# Patient Record
Sex: FEMALE | Race: WHITE | NOT HISPANIC OR LATINO | Employment: OTHER | URBAN - METROPOLITAN AREA
[De-identification: names, ages, dates, MRNs, and addresses within clinical notes are randomized per-mention and may not be internally consistent; named-entity substitution may affect disease eponyms.]

---

## 2021-04-15 DIAGNOSIS — Z23 ENCOUNTER FOR IMMUNIZATION: ICD-10-CM

## 2023-12-25 ENCOUNTER — HOSPITAL ENCOUNTER (EMERGENCY)
Facility: HOSPITAL | Age: 78
Discharge: HOME | End: 2023-12-26
Attending: EMERGENCY MEDICINE
Payer: COMMERCIAL

## 2023-12-25 DIAGNOSIS — R41.82 CHANGE IN MENTAL STATUS: ICD-10-CM

## 2023-12-25 DIAGNOSIS — R78.0 ELEVATED ETOH LEVEL: Primary | ICD-10-CM

## 2023-12-25 LAB
ALBUMIN SERPL BCP-MCNC: 4.4 G/DL (ref 3.5–5)
ALP SERPL-CCNC: 46 U/L (ref 34–104)
ALT SERPL W P-5'-P-CCNC: 16 U/L (ref 7–52)
ANION GAP SERPL CALCULATED.3IONS-SCNC: 12 MMOL/L
AST SERPL W P-5'-P-CCNC: 20 U/L (ref 13–39)
BASOPHILS # BLD AUTO: 0.03 THOUSANDS/ÂΜL (ref 0–0.1)
BASOPHILS NFR BLD AUTO: 1 % (ref 0–1)
BILIRUB SERPL-MCNC: 0.34 MG/DL (ref 0.2–1)
BUN SERPL-MCNC: 13 MG/DL (ref 5–25)
CALCIUM SERPL-MCNC: 9.2 MG/DL (ref 8.4–10.2)
CHLORIDE SERPL-SCNC: 100 MMOL/L (ref 96–108)
CO2 SERPL-SCNC: 24 MMOL/L (ref 21–32)
CREAT SERPL-MCNC: 0.83 MG/DL (ref 0.6–1.3)
EOSINOPHIL # BLD AUTO: 0.12 THOUSAND/ÂΜL (ref 0–0.61)
EOSINOPHIL NFR BLD AUTO: 3 % (ref 0–6)
ERYTHROCYTE [DISTWIDTH] IN BLOOD BY AUTOMATED COUNT: 13.3 % (ref 11.6–15.1)
ETHANOL SERPL-MCNC: 107 MG/DL
FLUAV RNA RESP QL NAA+PROBE: NEGATIVE
FLUBV RNA RESP QL NAA+PROBE: NEGATIVE
GFR SERPL CREATININE-BSD FRML MDRD: 68 ML/MIN/1.73SQ M
GLUCOSE SERPL-MCNC: 156 MG/DL (ref 65–140)
HCT VFR BLD AUTO: 40.4 % (ref 34.8–46.1)
HGB BLD-MCNC: 13.3 G/DL (ref 11.5–15.4)
IMM GRANULOCYTES # BLD AUTO: 0.02 THOUSAND/UL (ref 0–0.2)
IMM GRANULOCYTES NFR BLD AUTO: 1 % (ref 0–2)
LYMPHOCYTES # BLD AUTO: 0.76 THOUSANDS/ÂΜL (ref 0.6–4.47)
LYMPHOCYTES NFR BLD AUTO: 17 % (ref 14–44)
MCH RBC QN AUTO: 31.4 PG (ref 26.8–34.3)
MCHC RBC AUTO-ENTMCNC: 32.9 G/DL (ref 31.4–37.4)
MCV RBC AUTO: 96 FL (ref 82–98)
MONOCYTES # BLD AUTO: 0.33 THOUSAND/ÂΜL (ref 0.17–1.22)
MONOCYTES NFR BLD AUTO: 8 % (ref 4–12)
NEUTROPHILS # BLD AUTO: 3.14 THOUSANDS/ÂΜL (ref 1.85–7.62)
NEUTS SEG NFR BLD AUTO: 70 % (ref 43–75)
NRBC BLD AUTO-RTO: 0 /100 WBCS
PLATELET # BLD AUTO: 260 THOUSANDS/UL (ref 149–390)
PMV BLD AUTO: 10 FL (ref 8.9–12.7)
POTASSIUM SERPL-SCNC: 3.6 MMOL/L (ref 3.5–5.3)
PROT SERPL-MCNC: 7.1 G/DL (ref 6.4–8.4)
RBC # BLD AUTO: 4.23 MILLION/UL (ref 3.81–5.12)
RSV RNA RESP QL NAA+PROBE: NEGATIVE
SARS-COV-2 RNA RESP QL NAA+PROBE: NEGATIVE
SODIUM SERPL-SCNC: 136 MMOL/L (ref 135–147)
TSH SERPL DL<=0.05 MIU/L-ACNC: 1.81 UIU/ML (ref 0.45–4.5)
WBC # BLD AUTO: 4.4 THOUSAND/UL (ref 4.31–10.16)

## 2023-12-25 PROCEDURE — 80053 COMPREHEN METABOLIC PANEL: CPT

## 2023-12-25 PROCEDURE — 82077 ASSAY SPEC XCP UR&BREATH IA: CPT

## 2023-12-25 PROCEDURE — 0241U HB NFCT DS VIR RESP RNA 4 TRGT: CPT

## 2023-12-25 PROCEDURE — 93005 ELECTROCARDIOGRAM TRACING: CPT

## 2023-12-25 PROCEDURE — 96374 THER/PROPH/DIAG INJ IV PUSH: CPT

## 2023-12-25 PROCEDURE — 99285 EMERGENCY DEPT VISIT HI MDM: CPT

## 2023-12-25 PROCEDURE — 96361 HYDRATE IV INFUSION ADD-ON: CPT

## 2023-12-25 PROCEDURE — 36415 COLL VENOUS BLD VENIPUNCTURE: CPT

## 2023-12-25 PROCEDURE — 99285 EMERGENCY DEPT VISIT HI MDM: CPT | Performed by: EMERGENCY MEDICINE

## 2023-12-25 PROCEDURE — 84443 ASSAY THYROID STIM HORMONE: CPT

## 2023-12-25 PROCEDURE — 85025 COMPLETE CBC W/AUTO DIFF WBC: CPT

## 2023-12-25 RX ORDER — ONDANSETRON 2 MG/ML
4 INJECTION INTRAMUSCULAR; INTRAVENOUS ONCE
Status: COMPLETED | OUTPATIENT
Start: 2023-12-25 | End: 2023-12-25

## 2023-12-25 RX ADMIN — ONDANSETRON 4 MG: 2 INJECTION INTRAMUSCULAR; INTRAVENOUS at 23:25

## 2023-12-25 RX ADMIN — SODIUM CHLORIDE 1000 ML: 0.9 INJECTION, SOLUTION INTRAVENOUS at 22:13

## 2023-12-26 VITALS
DIASTOLIC BLOOD PRESSURE: 72 MMHG | TEMPERATURE: 97.7 F | SYSTOLIC BLOOD PRESSURE: 125 MMHG | WEIGHT: 176.81 LBS | OXYGEN SATURATION: 97 % | HEART RATE: 78 BPM | RESPIRATION RATE: 18 BRPM

## 2023-12-26 LAB
ATRIAL RATE: 80 BPM
BILIRUB UR QL STRIP: NEGATIVE
CLARITY UR: CLEAR
COLOR UR: NORMAL
GLUCOSE UR STRIP-MCNC: NEGATIVE MG/DL
HGB UR QL STRIP.AUTO: NEGATIVE
KETONES UR STRIP-MCNC: NEGATIVE MG/DL
LEUKOCYTE ESTERASE UR QL STRIP: NEGATIVE
NITRITE UR QL STRIP: NEGATIVE
P AXIS: 69 DEGREES
PH UR STRIP.AUTO: 6.5 [PH]
PR INTERVAL: 176 MS
PROT UR STRIP-MCNC: NEGATIVE MG/DL
QRS AXIS: 73 DEGREES
QRSD INTERVAL: 110 MS
QT INTERVAL: 426 MS
QTC INTERVAL: 491 MS
SP GR UR STRIP.AUTO: 1.01 (ref 1–1.03)
T WAVE AXIS: 74 DEGREES
UROBILINOGEN UR STRIP-ACNC: <2 MG/DL
VENTRICULAR RATE: 80 BPM

## 2023-12-26 PROCEDURE — 81003 URINALYSIS AUTO W/O SCOPE: CPT

## 2023-12-26 NOTE — ED ATTENDING ATTESTATION
12/25/2023  I, Jim Looney DO, saw and evaluated the patient. I have discussed the patient with the resident/non-physician practitioner and agree with the resident's/non-physician practitioner's findings, Plan of Care, and MDM as documented in the resident's/non-physician practitioner's note, except where noted. All available labs and Radiology studies were reviewed.  I was present for key portions of any procedure(s) performed by the resident/non-physician practitioner and I was immediately available to provide assistance.       At this point I agree with the current assessment done in the Emergency Department.  I have conducted an independent evaluation of this patient a history and physical is as follows:    Patient is a 78-year-old female with a history of cognitive decline who presents with change in mental status.  Patient and  state that she normally has 2 glasses of wine with dinner.  This evening she had about 3 glasses.  Her speech became slurred and she had an episode of vomiting.  She was leaning over a bucket of vomiting and was more difficult to arouse.  After vomiting, she did attempt to ambulate and needed significant help.   states that she seemed weak in bilateral legs.  Family was concerned that her symptoms were not explained by the alcohol and wanted her evaluated.    On exam, patient is in no acute distress.  Heart is regular rate and rhythm.  Breath sounds normal.  Abdomen is soft, nontender, nondistended.  No rebound or guarding.  Cranial nerves II through XII grossly intact.  Motor, sensation normal.  Visual fields intact.  Speech fluent. Cerebellar exam normal. No gait ataxia.     No neuro deficits. Although I considered TIA, I feel that this is less likely. Also considered ACS however EKG does not reveal acute ischemia. I do not feel that nausea is anginal equivalent. Patient did not lose consciousness. She was feeling lighheaded, but I do not feel this is due to  "cardiac etiology. EKG does not reveal any evidence of acute ischemia, significant bradycardia, advanced AV blocks, WPW, prolonged QTC (Qtc less than 500 ms), Brugada syndrome, hypertrophic cardiomyopathy, arrhythmogenic right ventricular dysplasia.     I suspect symptoms secondary to ETOH. She is now back to baseline and has no complaints. Discussed results and condition with patient and . They are comfortable with discharge and outpatient follow up. Will return to ED if she develops chest pain, shortness of breath, lightheadedness, dizziness, weakness, other concerns.     Portions of the above record have been created with voice recognition software.  Occasional wrong word or \"sound alike\" substitutions may have occurred due to the inherent limitations of voice recognition software.  Read the chart carefully and recognize, using context, where substitutions may have occurred.      ED Course         Critical Care Time  Procedures      "

## 2023-12-26 NOTE — ED NOTES
Unable to obtain oral temp at this time, patient vomiting      Lorelei Pulliam RN  12/25/23 2050

## 2023-12-26 NOTE — ED CARE HANDOFF
Emergency Department Sign Out Note        Sign out and transfer of care from Dr. Winters. See Separate Emergency Department note.     The patient, Desire Morales, was evaluated by the previous provider for vomiting, altered mental status after 2 glasses of wine at dinner.    Workup Completed:  CBC ,CMP, TSH , flu COVID RSV, alcohol level    ED Course / Workup Pending (followup):    Patient with elevated alcohol level of 107.      Urine analysis unremarkable.  Patient discharge with return precautions.                        ED Course as of 12/26/23 0433   Tue Dec 26, 2023   0100 UA w Reflex to Microscopic w Reflex to Culture  Unremarkable     Procedures  Medical Decision Making  Amount and/or Complexity of Data Reviewed  Labs: ordered. Decision-making details documented in ED Course.    Risk  Prescription drug management.            Disposition  Final diagnoses:   Elevated ETOH level   Change in mental status     Time reflects when diagnosis was documented in both MDM as applicable and the Disposition within this note       Time User Action Codes Description Comment    12/26/2023 12:29 AM Naveen Winters Add [E86.0] Dehydration     12/26/2023 12:29 AM Naveen Winters Add [R78.0] Elevated ETOH level     12/26/2023 12:55 AM Jim Looney Modify [R78.0] Elevated ETOH level     12/26/2023 12:55 AM Jim Looney Remove [E86.0] Dehydration     12/26/2023 12:55 AM Jim Looney Add [R41.82] Change in mental status           ED Disposition       ED Disposition   Discharge    Condition   Stable    Date/Time   Tue Dec 26, 2023 12:54 AM    Comment   Desire Morales discharge to home/self care.                   Follow-up Information       Follow up With Specialties Details Why Contact Info    Dylon Cohen MD  Schedule an appointment as soon as possible for a visit  Return to ED immediately sooner if recurrence of symptoms [address] [facility] [id number] [phone]            There are no discharge medications  for this patient.    No discharge procedures on file.       ED Provider  Electronically Signed by     Elle Patricio MD  12/26/23 9378

## 2023-12-26 NOTE — ED PROVIDER NOTES
History  Chief Complaint   Patient presents with    Altered Mental Status     Patient is up from [place] visiting family. Has hx of dementia, family states she is more altered than normal and has been vomiting., patient vomiting now in triage. Patient has two glasses of wine for dinner.      77yo F w/ h/o dementia presenting for AMS. Earlier this evening while Pt was having dinner family noticed she was slumped over in her chair more difficult to arouse and had slurred speech, dizziness, and had an episode of emesis. They state she had 3 glasses of wine, but normally drinks 2 every night without an issue. They were certain it wasn't caused by the wine and wanted her to get evaluated. Denies weakness, HA, vision changes, CP, SOB, confusion, numbness, facial droop.      History provided by:  Patient and relative      None       History reviewed. No pertinent past medical history.    History reviewed. No pertinent surgical history.    History reviewed. No pertinent family history.  I have reviewed and agree with the history as documented.    E-Cigarette/Vaping     E-Cigarette/Vaping Substances           Review of Systems   Constitutional: Negative.    HENT: Negative.     Eyes: Negative.    Respiratory: Negative.     Cardiovascular: Negative.    Gastrointestinal:  Positive for vomiting.   Genitourinary: Negative.    Musculoskeletal: Negative.    Skin: Negative.    Neurological:  Positive for dizziness and speech difficulty. Negative for weakness, light-headedness, numbness and headaches.   Psychiatric/Behavioral: Negative.         Physical Exam  ED Triage Vitals   Temperature Pulse Respirations Blood Pressure SpO2   12/25/23 2331 12/25/23 2049 12/25/23 2049 12/25/23 2050 12/25/23 2049   97.7 °F (36.5 °C) 84 18 (!) 197/108 97 %      Temp Source Heart Rate Source Patient Position - Orthostatic VS BP Location FiO2 (%)   12/25/23 2331 12/25/23 2049 12/25/23 2049 12/25/23 2049 --   Oral Monitor Sitting Right arm        Pain Score       --                    Orthostatic Vital Signs  Vitals:    12/25/23 2100 12/25/23 2230 12/26/23 0000 12/26/23 0106   BP: 156/72 144/66 135/70 125/72   Pulse: 64 70 80 78   Patient Position - Orthostatic VS: Sitting          Physical Exam  Constitutional:       General: She is not in acute distress.     Appearance: Normal appearance.   HENT:      Head: Normocephalic and atraumatic.      Right Ear: External ear normal.      Left Ear: External ear normal.      Nose: Nose normal. No rhinorrhea.      Mouth/Throat:      Mouth: Mucous membranes are dry.      Pharynx: Oropharynx is clear.   Eyes:      Extraocular Movements: Extraocular movements intact.      Conjunctiva/sclera: Conjunctivae normal.      Pupils: Pupils are equal, round, and reactive to light.   Cardiovascular:      Rate and Rhythm: Normal rate and regular rhythm.      Pulses: Normal pulses.      Heart sounds: Normal heart sounds.   Pulmonary:      Effort: Pulmonary effort is normal.      Breath sounds: Normal breath sounds.   Abdominal:      General: Abdomen is flat.      Palpations: Abdomen is soft.   Skin:     General: Skin is warm and dry.      Capillary Refill: Capillary refill takes 2 to 3 seconds.      Comments: Poor skin turgor   Neurological:      General: No focal deficit present.      Mental Status: She is alert and oriented to person, place, and time.      Cranial Nerves: No cranial nerve deficit.      Sensory: No sensory deficit.      Motor: No weakness.   Psychiatric:         Mood and Affect: Mood normal.         Behavior: Behavior normal.         ED Medications  Medications   sodium chloride 0.9 % bolus 1,000 mL (0 mL Intravenous Stopped 12/25/23 2324)   ondansetron (ZOFRAN) injection 4 mg (4 mg Intravenous Given 12/25/23 2325)       Diagnostic Studies  Results Reviewed       Procedure Component Value Units Date/Time    UA w Reflex to Microscopic w Reflex to Culture [[id number]] Collected: 12/26/23 0024    Lab Status: Final  result Specimen: Urine, Clean Catch Updated: 12/26/23 0040     Color, UA Light Yellow     Clarity, UA Clear     Specific Gravity, UA 1.006     pH, UA 6.5     Leukocytes, UA Negative     Nitrite, UA Negative     Protein, UA Negative mg/dl      Glucose, UA Negative mg/dl      Ketones, UA Negative mg/dl      Urobilinogen, UA <2.0 mg/dl      Bilirubin, UA Negative     Occult Blood, UA Negative    FLU/RSV/COVID - if FLU/RSV clinically relevant [[id number]]  (Normal) Collected: 12/25/23 2207    Lab Status: Final result Specimen: Nares from Nose Updated: 12/25/23 2302     SARS-CoV-2 Negative     INFLUENZA A PCR Negative     INFLUENZA B PCR Negative     RSV PCR Negative    Narrative:      FOR PEDIATRIC PATIENTS - copy/paste COVID Guidelines URL to browser: [email or web address]    SARS-CoV-2 assay is a Nucleic Acid Amplification assay intended for the  qualitative detection of nucleic acid from SARS-CoV-2 in nasopharyngeal  swabs. Results are for the presumptive identification of SARS-CoV-2 RNA.    Positive results are indicative of infection with SARS-CoV-2, the virus  causing COVID-19, but do not rule out bacterial infection or co-infection  with other viruses. Laboratories within the [place] and its  territories are required to report all positive results to the appropriate  public health authorities. Negative results do not preclude SARS-CoV-2  infection and should not be used as the sole basis for treatment or other  patient management decisions. Negative results must be combined with  clinical observations, patient history, and epidemiological information.  This test has not been FDA cleared or approved.    This test has been authorized by FDA under an Emergency Use Authorization  (EUA). This test is only authorized for the duration of time the  declaration that circumstances exist justifying the authorization of the  emergency use of an in vitro diagnostic  tests for detection of SARS-CoV-2  virus and/or diagnosis of COVID-19 infection under section 564(b)(1) of  the Act, 21 U.S.C. 360bbb-3(b)(1), unless the authorization is terminated  or revoked sooner. The test has been validated but independent review by FDA  and CLIA is pending.    Test performed using [employer] GeneXpert: This RT-PCR assay targets N2,  a region unique to SARS-CoV-2. A conserved region in the E-gene was chosen  for pan-Sarbecovirus detection which includes SARS-CoV-2.    According to CMS-2020-01-R, this platform meets the definition of high-throughput technology.    TSH, 3rd generation with Free T4 reflex [[id number]]  (Normal) Collected: 12/25/23 2207    Lab Status: Final result Specimen: Blood from Arm, Left Updated: 12/25/23 2251     TSH 3RD GENERATON 1.813 uIU/mL     Comprehensive metabolic panel [[id number]]  (Abnormal) Collected: 12/25/23 2207    Lab Status: Final result Specimen: Blood from Arm, Left Updated: 12/25/23 2237     Sodium 136 mmol/L      Potassium 3.6 mmol/L      Chloride 100 mmol/L      CO2 24 mmol/L      ANION GAP 12 mmol/L      BUN 13 mg/dL      Creatinine 0.83 mg/dL      Glucose 156 mg/dL      Calcium 9.2 mg/dL      AST 20 U/L      ALT 16 U/L      Alkaline Phosphatase 46 U/L      Total Protein 7.1 g/dL      Albumin 4.4 g/dL      Total Bilirubin 0.34 mg/dL      eGFR 68 ml/min/1.73sq m     Narrative:      National Kidney Disease Foundation guidelines for Chronic Kidney Disease (CKD):     Stage 1 with normal or high GFR (GFR > 90 mL/min/1.73 square meters)    Stage 2 Mild CKD (GFR = 60-89 mL/min/1.73 square meters)    Stage 3A Moderate CKD (GFR = 45-59 mL/min/1.73 square meters)    Stage 3B Moderate CKD (GFR = 30-44 mL/min/1.73 square meters)    Stage 4 Severe CKD (GFR = 15-29 mL/min/1.73 square meters)    Stage 5 End Stage CKD (GFR <15 mL/min/1.73 square meters)  Note: GFR calculation is accurate only with a steady state creatinine    Ethanol [[id number]]  (Abnormal) Collected:  12/25/23 2207    Lab Status: Final result Specimen: Blood from Arm, Left Updated: 12/25/23 2236     Ethanol Lvl 107 mg/dL     CBC and differential [[id number]] Collected: 12/25/23 2207    Lab Status: Final result Specimen: Blood from Arm, Left Updated: 12/25/23 2230     WBC 4.40 Thousand/uL      RBC 4.23 Million/uL      Hemoglobin 13.3 g/dL      Hematocrit 40.4 %      MCV 96 fL      MCH 31.4 pg      MCHC 32.9 g/dL      RDW 13.3 %      MPV 10.0 fL      Platelets 260 Thousands/uL      nRBC 0 /100 WBCs      Neutrophils Relative 70 %      Immat GRANS % 1 %      Lymphocytes Relative 17 %      Monocytes Relative 8 %      Eosinophils Relative 3 %      Basophils Relative 1 %      Neutrophils Absolute 3.14 Thousands/µL      Immature Grans Absolute 0.02 Thousand/uL      Lymphocytes Absolute 0.76 Thousands/µL      Monocytes Absolute 0.33 Thousand/µL      Eosinophils Absolute 0.12 Thousand/µL      Basophils Absolute 0.03 Thousands/µL                    No orders to display         Procedures  ECG 12 Lead Documentation Only    Date/Time: 12/26/2023 12:28 AM    Performed by: Naveen Winters MD  Authorized by: Naveen Winters MD    ECG reviewed by me, the ED Provider: yes    Patient location:  ED  Interpretation:     Interpretation: abnormal    Rate:     ECG rate:  80    ECG rate assessment: normal    Rhythm:     Rhythm: sinus rhythm    Ectopy:     Ectopy: none    QRS:     QRS axis:  Normal    QRS intervals:  Wide  Conduction:     Conduction: normal    ST segments:     ST segments:  Normal  T waves:     T waves: inverted      Inverted:  V1 and V2        ED Course  ED Course as of 12/27/23 1401   Tue Dec 26, 2023   0026 Pt feels improvement in her symptoms following fluid bolus. Family states that she appears back to her baseline.                                       Medical Decision Making  Pt's presentation was c/f EtOH intoxication vs. Electrolyte abnormality vs. Endocrinopathy vs. Arrhythmia. EKG wnl. TSH wnl making thyroidopathy  less likely. EtOH elevated making intoxication more likely the cause for her change in mentation. Neurological exam without focal deficits making intracranial pathology less likely. Following fluid bolus and time, her condition returned to baseline per the  who was at bedside the entire time. Strict return precautions discussed.     Amount and/or Complexity of Data Reviewed  Labs: ordered.    Risk  Prescription drug management.          Disposition  Final diagnoses:   Elevated ETOH level   Change in mental status     Time reflects when diagnosis was documented in both MDM as applicable and the Disposition within this note       Time User Action Codes Description Comment    12/26/2023 12:29 AM Naveen Winters Add [E86.0] Dehydration     12/26/2023 12:29 AM Naveen Winters Add [R78.0] Elevated ETOH level     12/26/2023 12:55 AM Jim Looney Modify [R78.0] Elevated ETOH level     12/26/2023 12:55 AM Jim Looney Remove [E86.0] Dehydration     12/26/2023 12:55 AM Jim Looney Add [R41.82] Change in mental status           ED Disposition       ED Disposition   Discharge    Condition   Stable    Date/Time   Tue Dec 26, 2023 12:54 AM    Comment   Desire Morales discharge to home/self care.                   Follow-up Information       Follow up With Specialties Details Why Contact Info    Dylon Cohen MD  Schedule an appointment as soon as possible for a visit  Return to ED immediately sooner if recurrence of symptoms [address] [facility] [id number] [phone]              There are no discharge medications for this patient.    No discharge procedures on file.    PDMP Review       None             ED Provider  Attending physically available and evaluated Desire Morales. I managed the patient along with the ED Attending.    Electronically Signed by           Naveen Winters MD  12/27/23 1682